# Patient Record
Sex: FEMALE | Race: WHITE | NOT HISPANIC OR LATINO | Employment: PART TIME | URBAN - METROPOLITAN AREA
[De-identification: names, ages, dates, MRNs, and addresses within clinical notes are randomized per-mention and may not be internally consistent; named-entity substitution may affect disease eponyms.]

---

## 2017-06-01 ENCOUNTER — HOSPITAL ENCOUNTER (EMERGENCY)
Facility: HOSPITAL | Age: 52
Discharge: HOME | End: 2017-06-01
Attending: EMERGENCY MEDICINE | Admitting: EMERGENCY MEDICINE
Payer: COMMERCIAL

## 2017-06-01 ENCOUNTER — APPOINTMENT (EMERGENCY)
Dept: RADIOLOGY | Facility: HOSPITAL | Age: 52
End: 2017-06-01
Payer: COMMERCIAL

## 2017-06-01 VITALS
RESPIRATION RATE: 18 BRPM | TEMPERATURE: 98.7 F | HEART RATE: 61 BPM | SYSTOLIC BLOOD PRESSURE: 158 MMHG | DIASTOLIC BLOOD PRESSURE: 77 MMHG | WEIGHT: 250 LBS | OXYGEN SATURATION: 97 %

## 2017-06-01 DIAGNOSIS — M25.551 RIGHT HIP PAIN: Primary | ICD-10-CM

## 2017-06-01 DIAGNOSIS — M79.18 RIGHT BUTTOCK PAIN: ICD-10-CM

## 2017-06-01 PROCEDURE — 73502 X-RAY EXAM HIP UNI 2-3 VIEWS: CPT

## 2017-06-01 PROCEDURE — 99283 EMERGENCY DEPT VISIT LOW MDM: CPT

## 2017-06-01 RX ORDER — MORPHINE SULFATE 30 MG/1
30 TABLET ORAL EVERY 4 HOURS PRN
Qty: 5 TABLET | Refills: 0 | Status: SHIPPED | OUTPATIENT
Start: 2017-06-01 | End: 2018-01-04

## 2018-01-03 ENCOUNTER — HOSPITAL ENCOUNTER (INPATIENT)
Facility: HOSPITAL | Age: 53
LOS: 1 days | Discharge: HOME | DRG: 097 | End: 2018-01-04
Attending: EMERGENCY MEDICINE | Admitting: HOSPITALIST
Payer: COMMERCIAL

## 2018-01-03 ENCOUNTER — APPOINTMENT (EMERGENCY)
Dept: CT IMAGING | Facility: HOSPITAL | Age: 53
DRG: 097 | End: 2018-01-03
Payer: COMMERCIAL

## 2018-01-03 DIAGNOSIS — J36 PERITONSILLAR ABSCESS: Primary | ICD-10-CM

## 2018-01-03 LAB
ALBUMIN SERPL BCP-MCNC: 4 G/DL (ref 3.5–5)
ALP SERPL-CCNC: 134 U/L (ref 46–116)
ALT SERPL W P-5'-P-CCNC: 28 U/L (ref 12–78)
ANION GAP SERPL CALCULATED.3IONS-SCNC: 8 MMOL/L (ref 4–13)
AST SERPL W P-5'-P-CCNC: 14 U/L (ref 5–45)
BASOPHILS # BLD AUTO: 0.01 THOUSANDS/ΜL (ref 0–0.1)
BASOPHILS NFR BLD AUTO: 0 % (ref 0–1)
BILIRUB SERPL-MCNC: 0.3 MG/DL (ref 0.2–1)
BUN SERPL-MCNC: 17 MG/DL (ref 5–25)
CALCIUM SERPL-MCNC: 9.6 MG/DL (ref 8.3–10.1)
CHLORIDE SERPL-SCNC: 103 MMOL/L (ref 100–108)
CO2 SERPL-SCNC: 29 MMOL/L (ref 21–32)
CREAT SERPL-MCNC: 0.87 MG/DL (ref 0.6–1.3)
EOSINOPHIL # BLD AUTO: 0.06 THOUSAND/ΜL (ref 0–0.61)
EOSINOPHIL NFR BLD AUTO: 1 % (ref 0–6)
ERYTHROCYTE [DISTWIDTH] IN BLOOD BY AUTOMATED COUNT: 13.7 % (ref 11.6–15.1)
GFR SERPL CREATININE-BSD FRML MDRD: 76 ML/MIN/1.73SQ M
GLUCOSE SERPL-MCNC: 145 MG/DL (ref 65–140)
HCT VFR BLD AUTO: 40.8 % (ref 34.8–46.1)
HGB BLD-MCNC: 13 G/DL (ref 11.5–15.4)
LYMPHOCYTES # BLD AUTO: 2.79 THOUSANDS/ΜL (ref 0.6–4.47)
LYMPHOCYTES NFR BLD AUTO: 28 % (ref 14–44)
MCH RBC QN AUTO: 27.4 PG (ref 26.8–34.3)
MCHC RBC AUTO-ENTMCNC: 31.9 G/DL (ref 31.4–37.4)
MCV RBC AUTO: 86 FL (ref 82–98)
MONOCYTES # BLD AUTO: 0.72 THOUSAND/ΜL (ref 0.17–1.22)
MONOCYTES NFR BLD AUTO: 7 % (ref 4–12)
NEUTROPHILS # BLD AUTO: 6.3 THOUSANDS/ΜL (ref 1.85–7.62)
NEUTS SEG NFR BLD AUTO: 64 % (ref 43–75)
PLATELET # BLD AUTO: 262 THOUSANDS/UL (ref 149–390)
PMV BLD AUTO: 10.5 FL (ref 8.9–12.7)
POTASSIUM SERPL-SCNC: 3.6 MMOL/L (ref 3.5–5.3)
PROT SERPL-MCNC: 8 G/DL (ref 6.4–8.2)
RBC # BLD AUTO: 4.75 MILLION/UL (ref 3.81–5.12)
S PYO AG THROAT QL: NEGATIVE
SODIUM SERPL-SCNC: 140 MMOL/L (ref 136–145)
WBC # BLD AUTO: 9.88 THOUSAND/UL (ref 4.31–10.16)

## 2018-01-03 PROCEDURE — 87070 CULTURE OTHR SPECIMN AEROBIC: CPT | Performed by: PHYSICIAN ASSISTANT

## 2018-01-03 PROCEDURE — 70491 CT SOFT TISSUE NECK W/DYE: CPT

## 2018-01-03 PROCEDURE — 96361 HYDRATE IV INFUSION ADD-ON: CPT

## 2018-01-03 PROCEDURE — 80053 COMPREHEN METABOLIC PANEL: CPT | Performed by: PHYSICIAN ASSISTANT

## 2018-01-03 PROCEDURE — 96374 THER/PROPH/DIAG INJ IV PUSH: CPT

## 2018-01-03 PROCEDURE — 96375 TX/PRO/DX INJ NEW DRUG ADDON: CPT

## 2018-01-03 PROCEDURE — 87430 STREP A AG IA: CPT | Performed by: PHYSICIAN ASSISTANT

## 2018-01-03 PROCEDURE — 36415 COLL VENOUS BLD VENIPUNCTURE: CPT | Performed by: PHYSICIAN ASSISTANT

## 2018-01-03 PROCEDURE — 81002 URINALYSIS NONAUTO W/O SCOPE: CPT | Performed by: PHYSICIAN ASSISTANT

## 2018-01-03 PROCEDURE — 85025 COMPLETE CBC W/AUTO DIFF WBC: CPT | Performed by: PHYSICIAN ASSISTANT

## 2018-01-03 RX ORDER — DEXAMETHASONE SODIUM PHOSPHATE 10 MG/ML
10 INJECTION, SOLUTION INTRAMUSCULAR; INTRAVENOUS ONCE
Status: COMPLETED | OUTPATIENT
Start: 2018-01-03 | End: 2018-01-03

## 2018-01-03 RX ORDER — MORPHINE SULFATE 4 MG/ML
4 INJECTION, SOLUTION INTRAMUSCULAR; INTRAVENOUS ONCE
Status: COMPLETED | OUTPATIENT
Start: 2018-01-03 | End: 2018-01-03

## 2018-01-03 RX ORDER — CLINDAMYCIN PHOSPHATE 600 MG/50ML
600 INJECTION INTRAVENOUS ONCE
Status: COMPLETED | OUTPATIENT
Start: 2018-01-03 | End: 2018-01-04

## 2018-01-03 RX ADMIN — DEXAMETHASONE SODIUM PHOSPHATE 10 MG: 10 INJECTION, SOLUTION INTRAMUSCULAR; INTRAVENOUS at 22:39

## 2018-01-03 RX ADMIN — SODIUM CHLORIDE 1000 ML: 0.9 INJECTION, SOLUTION INTRAVENOUS at 22:53

## 2018-01-03 RX ADMIN — MORPHINE SULFATE 4 MG: 4 INJECTION INTRAVENOUS at 22:38

## 2018-01-03 NOTE — Clinical Note
Case was discussed with BRIAN and the patient's admission status was agreed to be Admission Status: inpatient status to the service of Dr Abner Plummer

## 2018-01-04 VITALS
OXYGEN SATURATION: 98 % | BODY MASS INDEX: 49.69 KG/M2 | SYSTOLIC BLOOD PRESSURE: 172 MMHG | HEIGHT: 62 IN | RESPIRATION RATE: 18 BRPM | DIASTOLIC BLOOD PRESSURE: 89 MMHG | TEMPERATURE: 98.1 F | HEART RATE: 101 BPM | WEIGHT: 270 LBS

## 2018-01-04 PROBLEM — E11.9 TYPE 2 DIABETES MELLITUS, WITHOUT LONG-TERM CURRENT USE OF INSULIN (HCC): Status: ACTIVE | Noted: 2018-01-04

## 2018-01-04 PROBLEM — J36 PERITONSILLAR ABSCESS: Status: ACTIVE | Noted: 2018-01-04

## 2018-01-04 LAB
ANION GAP SERPL CALCULATED.3IONS-SCNC: 12 MMOL/L (ref 4–13)
BUN SERPL-MCNC: 12 MG/DL (ref 5–25)
CALCIUM SERPL-MCNC: 9.4 MG/DL (ref 8.3–10.1)
CHLORIDE SERPL-SCNC: 102 MMOL/L (ref 100–108)
CLARITY, POC: CLEAR
CO2 SERPL-SCNC: 24 MMOL/L (ref 21–32)
COLOR, POC: YELLOW
CREAT SERPL-MCNC: 0.73 MG/DL (ref 0.6–1.3)
ERYTHROCYTE [DISTWIDTH] IN BLOOD BY AUTOMATED COUNT: 13.6 % (ref 11.6–15.1)
EXT BILIRUBIN, UA: NEGATIVE
EXT BLOOD URINE: NEGATIVE
EXT GLUCOSE, UA: NEGATIVE
EXT KETONES: NEGATIVE
EXT NITRITE, UA: NEGATIVE
EXT PH, UA: 6
EXT PROTEIN, UA: NEGATIVE
EXT SPECIFIC GRAVITY, UA: 1.01
EXT UROBILINOGEN: NEGATIVE
GFR SERPL CREATININE-BSD FRML MDRD: 94 ML/MIN/1.73SQ M
GLUCOSE SERPL-MCNC: 215 MG/DL (ref 65–140)
GLUCOSE SERPL-MCNC: 255 MG/DL (ref 65–140)
GLUCOSE SERPL-MCNC: 257 MG/DL (ref 65–140)
HCT VFR BLD AUTO: 39.2 % (ref 34.8–46.1)
HGB BLD-MCNC: 12.2 G/DL (ref 11.5–15.4)
MCH RBC QN AUTO: 26.7 PG (ref 26.8–34.3)
MCHC RBC AUTO-ENTMCNC: 31.1 G/DL (ref 31.4–37.4)
MCV RBC AUTO: 86 FL (ref 82–98)
PLATELET # BLD AUTO: 252 THOUSANDS/UL (ref 149–390)
PMV BLD AUTO: 10.8 FL (ref 8.9–12.7)
POTASSIUM SERPL-SCNC: 4.4 MMOL/L (ref 3.5–5.3)
RBC # BLD AUTO: 4.57 MILLION/UL (ref 3.81–5.12)
SODIUM SERPL-SCNC: 138 MMOL/L (ref 136–145)
WBC # BLD AUTO: 9.99 THOUSAND/UL (ref 4.31–10.16)
WBC # BLD EST: NEGATIVE 10*3/UL

## 2018-01-04 PROCEDURE — 36415 COLL VENOUS BLD VENIPUNCTURE: CPT | Performed by: PHYSICIAN ASSISTANT

## 2018-01-04 PROCEDURE — 82948 REAGENT STRIP/BLOOD GLUCOSE: CPT

## 2018-01-04 PROCEDURE — 0C9PX0Z DRAINAGE OF TONSILS WITH DRAINAGE DEVICE, EXTERNAL APPROACH: ICD-10-PCS | Performed by: OTOLARYNGOLOGY

## 2018-01-04 PROCEDURE — 85027 COMPLETE CBC AUTOMATED: CPT | Performed by: PHYSICIAN ASSISTANT

## 2018-01-04 PROCEDURE — 80048 BASIC METABOLIC PNL TOTAL CA: CPT | Performed by: PHYSICIAN ASSISTANT

## 2018-01-04 PROCEDURE — 99284 EMERGENCY DEPT VISIT MOD MDM: CPT

## 2018-01-04 RX ORDER — GABAPENTIN 300 MG/1
100 CAPSULE ORAL 3 TIMES DAILY
COMMUNITY

## 2018-01-04 RX ORDER — LOSARTAN POTASSIUM 50 MG/1
50 TABLET ORAL 2 TIMES DAILY
COMMUNITY

## 2018-01-04 RX ORDER — MORPHINE SULFATE 15 MG/1
30 TABLET ORAL EVERY 4 HOURS PRN
Status: DISCONTINUED | OUTPATIENT
Start: 2018-01-04 | End: 2018-01-04 | Stop reason: HOSPADM

## 2018-01-04 RX ORDER — CLINDAMYCIN PHOSPHATE 600 MG/50ML
600 INJECTION INTRAVENOUS EVERY 8 HOURS
Status: DISCONTINUED | OUTPATIENT
Start: 2018-01-04 | End: 2018-01-04 | Stop reason: HOSPADM

## 2018-01-04 RX ORDER — PRAVASTATIN SODIUM 40 MG
40 TABLET ORAL
Status: DISCONTINUED | OUTPATIENT
Start: 2018-01-04 | End: 2018-01-04 | Stop reason: HOSPADM

## 2018-01-04 RX ORDER — SIMVASTATIN 20 MG
20 TABLET ORAL DAILY
COMMUNITY

## 2018-01-04 RX ORDER — LEVOTHYROXINE SODIUM 0.07 MG/1
75 TABLET ORAL DAILY
Status: DISCONTINUED | OUTPATIENT
Start: 2018-01-04 | End: 2018-01-04 | Stop reason: HOSPADM

## 2018-01-04 RX ORDER — LEVOTHYROXINE SODIUM 0.07 MG/1
75 TABLET ORAL DAILY
COMMUNITY

## 2018-01-04 RX ORDER — SODIUM CHLORIDE 9 MG/ML
125 INJECTION, SOLUTION INTRAVENOUS CONTINUOUS
Status: DISCONTINUED | OUTPATIENT
Start: 2018-01-04 | End: 2018-01-04 | Stop reason: HOSPADM

## 2018-01-04 RX ORDER — DEXAMETHASONE SODIUM PHOSPHATE 10 MG/ML
10 INJECTION, SOLUTION INTRAMUSCULAR; INTRAVENOUS EVERY 6 HOURS
Status: DISCONTINUED | OUTPATIENT
Start: 2018-01-04 | End: 2018-01-04 | Stop reason: HOSPADM

## 2018-01-04 RX ORDER — SENNOSIDES 8.6 MG
1 TABLET ORAL DAILY
Status: DISCONTINUED | OUTPATIENT
Start: 2018-01-04 | End: 2018-01-04 | Stop reason: HOSPADM

## 2018-01-04 RX ORDER — ONDANSETRON 2 MG/ML
4 INJECTION INTRAMUSCULAR; INTRAVENOUS EVERY 6 HOURS PRN
Status: DISCONTINUED | OUTPATIENT
Start: 2018-01-04 | End: 2018-01-04 | Stop reason: HOSPADM

## 2018-01-04 RX ORDER — GABAPENTIN 100 MG/1
100 CAPSULE ORAL 3 TIMES DAILY
Status: DISCONTINUED | OUTPATIENT
Start: 2018-01-04 | End: 2018-01-04 | Stop reason: HOSPADM

## 2018-01-04 RX ORDER — CALCIUM CARBONATE 200(500)MG
1000 TABLET,CHEWABLE ORAL DAILY PRN
Status: DISCONTINUED | OUTPATIENT
Start: 2018-01-04 | End: 2018-01-04 | Stop reason: HOSPADM

## 2018-01-04 RX ORDER — METHYLPREDNISOLONE 4 MG/1
TABLET ORAL
Qty: 21 TABLET | Refills: 0 | Status: SHIPPED | OUTPATIENT
Start: 2018-01-04

## 2018-01-04 RX ORDER — LOSARTAN POTASSIUM 50 MG/1
50 TABLET ORAL 2 TIMES DAILY
Status: DISCONTINUED | OUTPATIENT
Start: 2018-01-04 | End: 2018-01-04 | Stop reason: HOSPADM

## 2018-01-04 RX ORDER — CLINDAMYCIN HYDROCHLORIDE 300 MG/1
300 CAPSULE ORAL 4 TIMES DAILY
Qty: 28 CAPSULE | Refills: 0 | Status: SHIPPED | OUTPATIENT
Start: 2018-01-04 | End: 2018-01-11

## 2018-01-04 RX ADMIN — MORPHINE SULFATE 30 MG: 15 TABLET ORAL at 08:46

## 2018-01-04 RX ADMIN — INSULIN LISPRO 4 UNITS: 100 INJECTION, SOLUTION INTRAVENOUS; SUBCUTANEOUS at 07:07

## 2018-01-04 RX ADMIN — ENOXAPARIN SODIUM 40 MG: 40 INJECTION SUBCUTANEOUS at 08:41

## 2018-01-04 RX ADMIN — LOSARTAN POTASSIUM 50 MG: 50 TABLET, FILM COATED ORAL at 08:48

## 2018-01-04 RX ADMIN — IOHEXOL 85 ML: 350 INJECTION, SOLUTION INTRAVENOUS at 00:16

## 2018-01-04 RX ADMIN — LEVOTHYROXINE SODIUM 75 MCG: 75 TABLET ORAL at 08:20

## 2018-01-04 RX ADMIN — SODIUM CHLORIDE 125 ML/HR: 0.9 INJECTION, SOLUTION INTRAVENOUS at 04:18

## 2018-01-04 RX ADMIN — CLINDAMYCIN PHOSPHATE 600 MG: 12 INJECTION, SOLUTION INTRAMUSCULAR; INTRAVENOUS at 01:17

## 2018-01-04 RX ADMIN — DEXAMETHASONE SODIUM PHOSPHATE 10 MG: 10 INJECTION, SOLUTION INTRAMUSCULAR; INTRAVENOUS at 05:42

## 2018-01-04 RX ADMIN — CLINDAMYCIN PHOSPHATE 600 MG: 12 INJECTION, SOLUTION INTRAMUSCULAR; INTRAVENOUS at 08:50

## 2018-01-04 RX ADMIN — DEXAMETHASONE SODIUM PHOSPHATE 10 MG: 10 INJECTION, SOLUTION INTRAMUSCULAR; INTRAVENOUS at 10:35

## 2018-01-04 NOTE — DISCHARGE SUMMARY
Discharge- Lisbeth Saenz 1965, 45 y o  female MRN: [id number]    Unit/Bed#: ED 15 Encounter: [id number]    Primary Care Provider: Adia Mercado DO   Date and time admitted to hospital: 1/3/2018  9:53 PM        * Peritonsillar abscess   Assessment & Plan    · Status post incision and drainage  · DC home p o  clindamycin and steroids         Type 2 diabetes mellitus, without long-term current use of insulin ([address] )   Assessment & Plan    · Discussed with patient to monitor sugars closely while on steroid taper  · Follow up PCP this week              Consultations During Hospital Stay:  · ENT    Procedures Performed:     · Bedside incision and drainage of peritonsillar abscess  · CT of the neck Acute left tonsillitis with tonsillar and peritonsillar abscess measuring approximately 3 5 x 3 3 x 2 4 cm as described above  Incidental thyroid nodule(s) for which nonemergent thyroid ultrasound is recommended  Significant Findings / Test Results:     · As above    Incidental Findings:   · Thyroid nodule     Test Results Pending at Discharge (will require follow up): · None     Outpatient Tests Requested:  · None    Complications:  None    Reason for Admission:  Pain and sore throat    Hospital Course:     Lisbeth Saenz is a 45 y o  female patient who originally presented to the hospital on 1/3/2018 due to pain and sore throat  She was on amoxicillin for 1 week for upper surge tact infection without significant improvement then diagnosed with neck strain but continued having increasing pain with swallowing kidney your evaluation noted to have a per daughter abscess started on antibiotics and greatly    Please note the patient was felt to require inpatient admission due to degree of pain need for intravenous antibiotics and IV pain medications    She may dramatic and inspect improvement after ENT intervention is now stable for discharge to home    Please see above list of diagnoses and related plan for additional information  Condition at Discharge: good     Discharge Day Visit / Exam:     Subjective:  I feel much better  Vitals: Blood Pressure: (!) 175/79 (01/04/18 0725)  Pulse: 71 (01/04/18 0725)  Temperature: 98 1 °F (36 7 °C) (01/04/18 0725)  Temp Source: Oral (01/04/18 0725)  Respirations: 16 (01/04/18 0725)  Height: 5' 2" (157 5 cm) (01/04/18 0324)  Weight - Scale: 122 kg (270 lb) (01/03/18 2117)  SpO2: 96 % (01/04/18 0725)  Exam:   Physical Exam   Constitutional: She is oriented to person, place, and time  No distress  HENT:   Head: Normocephalic and atraumatic  Mouth/Throat: Oropharynx is clear and moist  No oropharyngeal exudate  No neck masses subtle nontender   Eyes: EOM are normal  Pupils are equal, round, and reactive to light  No scleral icterus  Neck: Neck supple  No JVD present  No tracheal deviation present  No thyromegaly present  Cardiovascular: Normal rate  Exam reveals no gallop and no friction rub  No murmur heard  Pulmonary/Chest: Effort normal  No respiratory distress  She has no wheezes  She has no rales  Abdominal: Soft  She exhibits no distension  There is no tenderness  There is no rebound and no guarding  Musculoskeletal: She exhibits no edema or tenderness  Lymphadenopathy:     She has no cervical adenopathy  Neurological: She is alert and oriented to person, place, and time  Skin: She is not diaphoretic  Discharge instructions/Information to patient and family:   See after visit summary for information provided to patient and family  Provisions for Follow-Up Care:  See after visit summary for information related to follow-up care and any pertinent home health orders  Disposition:     Home    For Discharges to [facility] SNF:   · Not Applicable to this Patient - Not Applicable to this Patient    Planned Readmission:  Now     Discharge Statement:  I spent 30 minutes discharging the patient  This time was spent on the day of discharge   I had direct contact with the patient on the day of discharge  Greater than 50% of the total time was spent examining patient, answering all patient questions, arranging and discussing plan of care with patient as well as directly providing post-discharge instructions  Additional time then spent on discharge activities  Discharge Medications:  See after visit summary for reconciled discharge medications provided to patient and family        ** Please Note: This note has been constructed using a voice recognition system **

## 2018-01-04 NOTE — CONSULTS
Left PTA     Drained 5 cc of pus with aspiration    Plan ; ok to d/c on po clindamycin and medrol dose armando     F/u in my office to assure complete resolution and plan for T/A

## 2018-01-04 NOTE — ASSESSMENT & PLAN NOTE
· CT neck soft tissue:  Tonsillar enlargement bilaterally with a low-density identified in the left tonsillar region which measures 3 1x1 9 cm and extends laterally to the level of the tip of the stylohyoid ligament  · Pain control  · IV clinda  · IV dexamethasone  · ENT consulted, appreciate input

## 2018-01-04 NOTE — H&P
H&P- Kristin Capps 1965, 45 y o  female MRN: [id number]    Unit/Bed#: ED 15 Encounter: [id number]    Primary Care Provider: Swetha Watts DO   Date and time admitted to hospital: 1/3/2018  9:53 PM    * Peritonsillar abscess   Assessment & Plan    · CT neck soft tissue: Tonsillar enlargement bilaterally with a low-density identified in the left tonsillar region which measures 3 1x1 9 cm and extends laterally to the level of the tip of the stylohyoid ligament  · Pain control  · IV clinda  · IV dexamethasone  · ENT consulted, appreciate input        Type 2 diabetes mellitus, without long-term current use of insulin (HCC)   Assessment & Plan    · Hold PO while inpatient, SSI for correction        Hypertension   Assessment & Plan    · Continue home medications          Acquired hypothyroidism   Assessment & Plan    · Continue synthroid            VTE Prophylaxis: Enoxaparin (Lovenox)  / sequential compression device   Code Status: Level 1-- Full Code  POLST: POLST form is not discussed and not completed at this time  Discussion with family: no family available     Anticipated Length of Stay:  Patient will be admitted on an Inpatient basis with an anticipated length of stay of  > 2 midnights  Justification for Hospital Stay: peritonsillar abscess tx by ENT    Total Time for Visit, including Counseling / Coordination of Care: 30 minutes  Greater than 50% of this total time spent on direct patient counseling and coordination of care  Chief Complaint:   Sore throat    History of Present Illness:    Kristin Capps is a 45 y o  female with past medical history significant for hypertension, hyperlipidemia, hypothyroidism since the ED for evaluation of sore throat x1 week  Patient reports she noted symptoms started approximately 1 year ago when she slipped and fell upper steps on ice and heard a crack on the left side of her neck  Since then, patient reports increasing pain and swelling   Initially, patient went to an urgent care and was diagnosed with a sinus infection  Patient was placed on antibiotics and had no improvement of symptoms  She reports being on amoxicillin for approximately 1 week  Patient went to her primary care doctor and she was diagnosed with the neck strain  Patient reports over the last 24 hours she has had increased difficulty talking and swallowing secondary to pain  She denies any fevers or chills  Denies any shortness of breath or respiratory distress  Patient reports her 13year-old child at home was sick with similar symptoms  Review of Systems:    Review of Systems   Constitutional: Negative  HENT: Positive for sore throat, trouble swallowing and voice change  Eyes: Negative  Cardiovascular: Negative  Gastrointestinal: Negative  Genitourinary: Negative  Musculoskeletal: Positive for neck pain  Skin: Negative  Neurological: Negative  Hematological: Negative  Psychiatric/Behavioral: Negative  Past Medical and Surgical History:     Past Medical History:   Diagnosis Date    Diabetes mellitus [address] [address]     Disease of thyroid gland     Hypertension        Past Surgical History:   Procedure Laterality Date    BUNIONECTOMY Left     SINUS SURGERY         Meds/Allergies:    Prior to Admission medications    Medication Sig Start Date End Date Taking? Authorizing Provider   gabapentin (NEURONTIN) 300 mg capsule Take 100 mg by mouth 3 (three) times a day   Yes Historical Provider, MD   levothyroxine 75 mcg tablet Take 75 mcg by mouth daily   Yes Historical Provider, MD   losartan (COZAAR) 50 mg tablet Take 50 mg by mouth 2 (two) times a day 50 mg in the morning and 25 mg at night     Yes Historical Provider, MD   metFORMIN (GLUCOPHAGE) 500 mg tablet Take 500 mg by mouth 2 (two) times a day with meals   Yes Historical Provider, MD   simvastatin (ZOCOR) 20 mg tablet Take 20 mg by mouth daily   Yes Historical Provider, MD   sitaGLIPtin (JANUVIA) 100 mg tablet Take 100 mg by mouth daily   Yes Historical Provider, MD   morphine (MSIR) 30 MG tablet Take 1 tablet by mouth every 4 (four) hours as needed for moderate pain or severe pain for up to 5 doses Max Daily Amount: 180 mg 6/1/17 1/4/18  Annabella Estevez MD     I have reviewed home medications with patient personally  Allergies: Allergies   Allergen Reactions    Sulfa Antibiotics Swelling       Social History:     Marital Status: Single   Occupation: employee of [employer]  Patient Pre-hospital Living Situation: home with family  Patient Pre-hospital Level of Mobility: independent  Patient Pre-hospital Diet Restrictions: diabetic prudent  Substance Use History:   History   Alcohol Use    Yes     Comment: socially     History   Smoking Status    Former Smoker   Smokeless Tobacco    Never Used     History   Drug Use No       Family History:    non-contributory    Physical Exam:     Vitals:   Blood Pressure: 152/71 (01/04/18 0549)  Pulse: (!) 53 (01/04/18 0549)  Temperature: 97 7 °F (36 5 °C) (01/03/18 2117)  Temp Source: Oral (01/03/18 2117)  Respirations: 18 (01/04/18 0549)  Height: 5' 2" (157 5 cm) (01/04/18 0324)  Weight - Scale: 122 kg (270 lb) (01/03/18 2117)  SpO2: 96 % (01/04/18 0549)    Physical Exam   Constitutional: She is oriented to person, place, and time  She appears well-developed and well-nourished  No distress  HENT:   Head: Normocephalic and atraumatic  Mouth/Throat: No oropharyngeal exudate  No exudates  Swelling surrounding the L tonsil with erythema  Eyes: Conjunctivae and EOM are normal  Pupils are equal, round, and reactive to light  Neck: No JVD present  L submandibular region swollen   Cardiovascular: Normal rate and regular rhythm  Exam reveals no gallop and no friction rub  No murmur heard  Pulmonary/Chest: Effort normal and breath sounds normal  No respiratory distress  She has no wheezes  She has no rales  Abdominal: Soft   Bowel sounds are normal  She exhibits no distension  There is no tenderness  There is no rebound  Musculoskeletal: She exhibits no edema or tenderness  Neurological: She is alert and oriented to person, place, and time  She displays normal reflexes  No cranial nerve deficit  She exhibits normal muscle tone  Skin: Skin is warm and dry  No rash noted  She is not diaphoretic  No erythema  Psychiatric: She has a normal mood and affect  Her behavior is normal            Additional Data:     Lab Results: I have personally reviewed pertinent reports  Results from last 7 days  Lab Units 01/03/18  2231   WBC Thousand/uL 9 88   HEMOGLOBIN g/dL 13 0   HEMATOCRIT % 40 8   PLATELETS Thousands/uL 262   NEUTROS PCT % 64   LYMPHS PCT % 28   MONOS PCT % 7   EOS PCT % 1       Results from last 7 days  Lab Units 01/03/18  2231   SODIUM mmol/L 140   POTASSIUM mmol/L 3 6   CHLORIDE mmol/L 103   CO2 mmol/L 29   BUN mg/dL 17   CREATININE mg/dL 0 87   CALCIUM mg/dL 9 6   TOTAL PROTEIN g/dL 8 0   BILIRUBIN TOTAL mg/dL 0 30   ALK PHOS U/L 134*   ALT U/L 28   AST U/L 14   GLUCOSE RANDOM mg/dL 145*           Imaging: I have personally reviewed pertinent reports  CT soft tissue neck with contrast   ED Interpretation by Rigo Higgins PA-C (01/04 0050)   IMPRESSION:   There is tonsillar enlargement bilaterally with a low density identified in the left tonsillar region which   measures 3 1 x 1 9 cm on image 44 and extends laterally to the level of the tip of the stylohyoid   ligament, consistent with a peritonsillar abscess  Final Result by Celine Washington MD (00/74 [id number])      Acute left tonsillitis with tonsillar and peritonsillar abscess measuring approximately 3 5 x 3 3 x 2 4 cm as described above  Incidental thyroid nodule(s) for which nonemergent thyroid ultrasound is recommended        Findings are consistent with the preliminary report from [facility] which was provided shortly after completion of the exam  Workstation performed: [id number]             EKG, Pathology, and Other Studies Reviewed on Admission:   · EKG: unavailable    Allscripts / Epic Records Reviewed: No     ** Please Note: This note has been constructed using a voice recognition system   **

## 2018-01-04 NOTE — ASSESSMENT & PLAN NOTE
· Discussed with patient to monitor sugars closely while on steroid taper    · Follow up PCP this week

## 2018-01-04 NOTE — ED PROVIDER NOTES
History  Chief Complaint   Patient presents with    Sore Throat     pt c/o fall x 1 week prior, "heard a snap in her neck and ever since c/o sore thoat with high blood pressure"     49-year-old female presents to the emergency department with complaints of a sore throat  States that she has had her symptoms over the past week  Notes that prior to symptoms starting she had fallen and felt a crack in the left side of her neck  States that she has had increasing pain since that time  Denies any changes in her vision or hearing  States that she has not had any fevers at home  Notes that her 13year-old child was sick with similar symptoms of a sore throat with seems to gotten better  Patient states that she has been on amoxicillin over the past week without improvement  Notes that over the past 24 hours she has had difficulty talking and swallowing  Also with increased pain when trying to open her mouth  States that the left side of her neck feels swollen with radiation of pain into the ear  She does not smoke  History provided by:  Patient   used: No    Sore Throat   Location:  Left  Quality:  Aching and sore  Onset quality:  Gradual  Duration:  1 week  Timing:  Constant  Progression:  Worsening  Chronicity:  New  Relieved by:  Nothing  Ineffective treatments:  OTC medications (Amoxicillin)  Associated symptoms: adenopathy, trouble swallowing and voice change    Associated symptoms: no abdominal pain, no chest pain, no chills, no cough, no drooling, no ear discharge, no ear pain, no epistaxis, no eye discharge, no fever, no headaches, no neck stiffness, no night sweats, no plugged ear sensation, no postnasal drip, no rash, no rhinorrhea, no shortness of breath, no sinus congestion and no stridor        Prior to Admission Medications   Prescriptions Last Dose Informant Patient Reported?  Taking?   metFORMIN (GLUCOPHAGE) 500 mg tablet   Yes No   Sig: Take 500 mg by mouth 2 (two) times a day with meals   morphine (MSIR) 30 MG tablet   No No   Sig: Take 1 tablet by mouth every 4 (four) hours as needed for moderate pain or severe pain for up to 5 doses Max Daily Amount: 180 mg      Facility-Administered Medications: None       Past Medical History:   Diagnosis Date    Diabetes mellitus ([facility] [address] )     Disease of thyroid gland     Hypertension        Past Surgical History:   Procedure Laterality Date    BUNIONECTOMY Left     SINUS SURGERY         History reviewed  No pertinent family history  I have reviewed and agree with the history as documented  Social History   Substance Use Topics    Smoking status: Former Smoker    Smokeless tobacco: Never Used    Alcohol use Yes      Comment: socially        Review of Systems   Constitutional: Negative for activity change, chills, fever and night sweats  HENT: Positive for sore throat, trouble swallowing and voice change  Negative for dental problem, drooling, ear discharge, ear pain, mouth sores, nosebleeds, postnasal drip, rhinorrhea, sinus pressure, sneezing and tinnitus  Eyes: Negative for pain, discharge and itching  Respiratory: Negative for cough, chest tightness, shortness of breath, wheezing and stridor  Cardiovascular: Negative for chest pain  Gastrointestinal: Negative for abdominal pain  Musculoskeletal: Negative for neck stiffness  Skin: Negative for rash and wound  Neurological: Negative for dizziness, light-headedness and headaches  Hematological: Positive for adenopathy  All other systems reviewed and are negative        Physical Exam  ED Triage Vitals [01/03/18 2117]   Temperature Pulse Respirations Blood Pressure SpO2   97 7 °F (36 5 °C) 68 18 (!) 208/102 100 %      Temp Source Heart Rate Source Patient Position - Orthostatic VS BP Location FiO2 (%)   Oral Monitor Sitting Left arm --      Pain Score       Worst Possible Pain           Orthostatic Vital Signs  Vitals:    01/03/18 2117   BP: (!) 208/102 Pulse: 68   Patient Position - Orthostatic VS: Sitting       Physical Exam   Constitutional: She is oriented to person, place, and time  Vital signs are normal  She appears well-developed and well-nourished  No distress  HENT:   Head: Normocephalic and atraumatic  Right Ear: Hearing, tympanic membrane, external ear and ear canal normal    Left Ear: Hearing, tympanic membrane, external ear and ear canal normal    Nose: Nose normal    Mouth/Throat: Uvula is midline  There is trismus in the jaw  Posterior oropharyngeal erythema present  No oropharyngeal exudate  Right-sided uvular deviation with swelling in the posterior left-sided oral pharynx and tonsillar area  Eyes: Conjunctivae, EOM and lids are normal    Cardiovascular: Normal rate and regular rhythm  Exam reveals no gallop and no friction rub  No murmur heard  Pulmonary/Chest: Effort normal and breath sounds normal  No respiratory distress  She has no wheezes  She has no rhonchi  She has no rales  She exhibits no tenderness  Musculoskeletal: Normal range of motion  Neurological: She is alert and oriented to person, place, and time  Skin: Skin is warm and dry  She is not diaphoretic  Psychiatric: She has a normal mood and affect  Her behavior is normal    Vitals reviewed        ED Medications  Medications   clindamycin (CLEOCIN) IVPB (premix) 600 mg (not administered)   sodium chloride 0 9 % bolus 1,000 mL (1,000 mL Intravenous New Bag 1/3/18 2253)   dexamethasone (PF) (DECADRON) injection 10 mg (10 mg Intravenous Given 1/3/18 2239)   morphine (PF) 4 mg/mL injection 4 mg (4 mg Intravenous Given 1/3/18 2238)   iohexol (OMNIPAQUE) 350 MG/ML injection (MULTI-DOSE) 85 mL (85 mL Intravenous Given 1/4/18 0016)       Diagnostic Studies  Results Reviewed     Procedure Component Value Units Date/Time    Comprehensive metabolic panel [[id number]]  (Abnormal) Collected:  01/03/18 2231    Lab Status:  Final result Specimen:  Blood from Arm, Right Updated: 01/03/18 2307     Sodium 140 mmol/L      Potassium 3 6 mmol/L      Chloride 103 mmol/L      CO2 29 mmol/L      Anion Gap 8 mmol/L      BUN 17 mg/dL      Creatinine 0 87 mg/dL      Glucose 145 (H) mg/dL      Calcium 9 6 mg/dL      AST 14 U/L      ALT 28 U/L      Alkaline Phosphatase 134 (H) U/L      Total Protein 8 0 g/dL      Albumin 4 0 g/dL      Total Bilirubin 0 30 mg/dL      eGFR 76 ml/min/1 73sq m     Narrative:         National Kidney Disease Education Program recommendations are as follows:  GFR calculation is accurate only with a steady state creatinine  Chronic Kidney disease less than 60 ml/min/1 73 sq  meters  Kidney failure less than 15 ml/min/1 73 sq  meters  Rapid Beta strep screen [[id number]]  (Normal) Collected:  01/03/18 2231    Lab Status:  Final result Specimen:  Throat from Throat Updated:  01/03/18 2248     Rapid Strep A Screen Negative    Throat culture [[id number]] Collected:  01/03/18 2231    Lab Status:   In process Specimen:  Throat from Throat Updated:  01/03/18 2248    CBC and differential [[id number]]  (Normal) Collected:  01/03/18 2231    Lab Status:  Final result Specimen:  Blood from Arm, Right Updated:  01/03/18 2238     WBC 9 88 Thousand/uL      RBC 4 75 Million/uL      Hemoglobin 13 0 g/dL      Hematocrit 40 8 %      MCV 86 fL      MCH 27 4 pg      MCHC 31 9 g/dL      RDW 13 7 %      MPV 10 5 fL      Platelets 468 Thousands/uL      Neutrophils Relative 64 %      Lymphocytes Relative 28 %      Monocytes Relative 7 %      Eosinophils Relative 1 %      Basophils Relative 0 %      Neutrophils Absolute 6 30 Thousands/µL      Lymphocytes Absolute 2 79 Thousands/µL      Monocytes Absolute 0 72 Thousand/µL      Eosinophils Absolute 0 06 Thousand/µL      Basophils Absolute 0 01 Thousands/µL     POCT urinalysis dipstick [[id number]]     Lab Status:  No result Specimen:  Urine                  CT soft tissue neck with contrast   ED Interpretation by Kevin Ayala PA-C (01/04 0050) IMPRESSION:   There is tonsillar enlargement bilaterally with a low density identified in the left tonsillar region which   measures 3 1 x 1 9 cm on image 44 and extends laterally to the level of the tip of the stylohyoid   ligament, consistent with a peritonsillar abscess  Procedures  Procedures       Phone Contacts  ED Phone Contact    ED Course  ED Course as of Jan 04 0102   u Jan 04, 2018   [id number] Call placed to ENT on call  Annette 83Maryuri with Dr Whit Gordon regarding results  [facility] and he will see in the morning  MDM  Number of Diagnoses or Management Options  Diagnosis management comments: Differential diagnosis includes but not limited to:  Peritonsillar abscess, pharyngeal abscess, phlegmon, strep pharyngitis  Amount and/or Complexity of Data Reviewed  Clinical lab tests: ordered and reviewed  Tests in the radiology section of CPT®: ordered      CritCare Time    Disposition  Final diagnoses:   Peritonsillar abscess     Time reflects when diagnosis was documented in both MDM as applicable and the Disposition within this note     Time User Action Codes Description Comment    1/4/2018  1:00 AM Jose Maurer Add [J36] Peritonsillar abscess       ED Disposition     ED Disposition Condition Comment    Admit  Case was discussed with BRIAN and the patient's admission status was agreed to be Admission Status: inpatient status to the service of Dr Do Gains   Follow-up Information    None       Patient's Medications   Discharge Prescriptions    No medications on file     No discharge procedures on file      ED Provider  Electronically Signed by           Kevin Ayala PA-C  01/04/18 0258

## 2018-01-04 NOTE — ED NOTES
Pt  Discharged, awake and alert, no distress  Pt  With no questions at time of dispo  Pt  Ambulated out of dept   With      Kelli Mendieta RN  01/04/18 1443

## 2018-01-04 NOTE — CASE MANAGEMENT
Initial Clinical Review    Admission: Date/Time/Statement: 1/4/18 @ 0102     Orders Placed This Encounter   Procedures    Inpatient Admission (expected length of stay for this patient is greater than two midnights)     Standing Status:   Standing     Number of Occurrences:   1     Order Specific Question:   Admitting Physician     Answer:   Jocelyn Morrison [[id number]]     Order Specific Question:   Level of Care     Answer:   Med Surg [16]     Order Specific Question:   Estimated length of stay     Answer:   More than 2 Midnights     Order Specific Question:   Certification     Answer:   I certify that inpatient services are medically necessary for this patient for a duration of greater than two midnights  See H&P and MD Progress Notes for additional information about the patient's course of treatment  ED: Date/Time/Mode of Arrival:   ED Arrival Information     Expected Arrival Acuity Means of Arrival Escorted By Service Admission Type    - 1/3/2018 21:02 Less Urgent Walk-In Self General Medicine Urgent    Arrival Complaint    SORE THROAT          Chief Complaint:   Chief Complaint   Patient presents with    Sore Throat     pt c/o fall x 1 week prior, "heard a snap in her neck and ever since c/o sore thoat with high blood pressure"       History of Illness: 45 y o  female with past medical history significant for hypertension, hyperlipidemia, hypothyroidism since the ED for evaluation of sore throat x1 week  Patient reports she noted symptoms started approximately 1 year ago when she slipped and fell upper steps on ice and heard a crack on the left side of her neck  Since then, patient reports increasing pain and swelling  Initially, patient went to an urgent care and was diagnosed with a sinus infection  Patient was placed on antibiotics and had no improvement of symptoms  She reports being on amoxicillin for approximately 1 week    Patient went to her primary care doctor and she was diagnosed with the neck strain  Patient reports over the last 24 hours she has had increased difficulty talking and swallowing secondary to pain  She denies any fevers or chills  Denies any shortness of breath or respiratory distress  Patient reports her 13year-old child at home was sick with similar symptoms  ED Vital Signs:   ED Triage Vitals [01/03/18 2117]   Temperature Pulse Respirations Blood Pressure SpO2   97 7 °F (36 5 °C) 68 18 (!) 208/102 100 %      Temp Source Heart Rate Source Patient Position - Orthostatic VS BP Location FiO2 (%)   Oral Monitor Sitting Left arm --      Pain Score       Worst Possible Pain        Wt Readings from Last 1 Encounters:   01/03/18 122 kg (270 lb)       Vital Signs (abnormal):  /102 - 175/79    Abnormal Labs/Diagnostic Test Results:   Glucose 145  Alkaline phosphatase 134  Ct soft tissue neck - Acute left tonsillitis with tonsillar and peritonsillar abscess measuring approximately 3 5 x 3 3 x 2 4 cm as described above  Incidental thyroid nodule(s) for which nonemergent thyroid ultrasound is recommended  1/4 0631- glucose 257        ED Treatment:   Medication Administration from 01/03/2018 2102 to 01/04/2018 7222       Date/Time Order Dose Route Action Action by Comments     01/04/2018 0119 sodium chloride 0 9 % bolus 1,000 mL 0 mL Intravenous Stopped Gael Polo RN      01/03/2018 2253 sodium chloride 0 9 % bolus 1,000 mL 1,000 mL Intravenous [address] Hugo Hsu [facility]      01/03/2018 2239 dexamethasone (PF) (DECADRON) injection 10 mg 10 mg Intravenous Given Hugo Hsu RN      01/03/2018 2238 morphine (PF) 4 mg/mL injection 4 mg 4 mg Intravenous Given Hugo Hsu RN      01/04/2018 0226 clindamycin (CLEOCIN) IVPB (premix) 600 mg 0 mg Intravenous Stopped Suellen Leventhal, RN      01/04/2018 0117 clindamycin (CLEOCIN) IVPB (premix) 600 mg 600 mg Intravenous [address] Gael Polo RN      01/04/2018 0016 iohexol (OMNIPAQUE) 350 MG/ML injection (MULTI-DOSE) 85 mL 85 mL Intravenous Given Judy Hendrix [place]-[facility]son      01/04/2018 0846 morphine (MSIR) IR tablet 30 mg 30 mg Oral Given Mohit Corral RN      01/04/2018 0418 sodium chloride 0 9 % infusion 125 mL/hr Intravenous New Bag Ronald Ledbetter RN      01/04/2018 0840 senna (SENOKOT) tablet 8 6 mg 8 6 mg Oral Not Given Mohit Corral RN      01/04/2018 0841 enoxaparin (LOVENOX) subcutaneous injection 40 mg 40 mg Subcutaneous Given Mohit Corral RN      01/04/2018 9814 insulin lispro (HumaLOG) 100 units/mL subcutaneous injection 2-12 Units 4 Units Subcutaneous Given Ronald Ledbetter RN      01/04/2018 0542 dexamethasone (PF) (DECADRON) injection 10 mg 10 mg Intravenous Given Ronald Ledbetter RN      01/04/2018 0850 clindamycin (CLEOCIN) IVPB (premix) 600 mg 600 mg Intravenous [address] Mohit Corral RN      01/04/2018 0326 clindamycin (CLEOCIN) IVPB (premix) 600 mg 600 mg Intravenous Not Given Ronald Ledbetter RN Pt given medication at 9914  Provider notified  01/04/2018 0840 gabapentin (NEURONTIN) capsule 100 mg 100 mg Oral Not Given Mohit Corral RN      01/04/2018 0820 levothyroxine tablet 75 mcg 75 mcg Oral Given Mohit Corral RN      01/04/2018 0848 losartan (COZAAR) tablet 50 mg 50 mg Oral Given Mohit Corral RN           Past Medical/Surgical History: Active Ambulatory Problems     Diagnosis Date Noted    No Active Ambulatory Problems     Resolved Ambulatory Problems     Diagnosis Date Noted    No Resolved Ambulatory Problems     Past Medical History:   Diagnosis Date    Diabetes mellitus ([facility] [address] )     Disease of thyroid gland     Hypertension        Admitting Diagnosis: Sore throat [J02 9]    Age/Sex: 45 y o  female    Assessment/Plan:   Peritonsillar abscess   Assessment & Plan     · CT neck soft tissue:  Tonsillar enlargement bilaterally with a low-density identified in the left tonsillar region which measures 3 1x1 9 cm and extends laterally to the level of the tip of the stylohyoid ligament  · Pain control  · IV clinda  · IV dexamethasone  · ENT consulted, appreciate input       Type 2 diabetes mellitus, without long-term current use of insulin (HCC)   Assessment & Plan     · Hold PO while inpatient, SSI for correction       Hypertension   Assessment & Plan     · Continue home medications          Acquired hypothyroidism   Assessment & Plan     · Continue synthroid         Admission Orders:  1/4/2018  0102 INPATIENT   Scheduled Meds:   clindamycin 600 mg Intravenous Q8H   dexamethasone 10 mg Intravenous Q6H   enoxaparin 40 mg Subcutaneous Daily   gabapentin 100 mg Oral TID   insulin lispro 2-12 Units Subcutaneous Q6H [address]   levothyroxine 75 mcg Oral Daily   losartan 50 mg Oral BID   pravastatin 40 mg Oral Daily With Dinner   senna 1 tablet Oral Daily     Continuous Infusions:   sodium chloride 125 mL/hr Last Rate: 125 mL/hr (01/04/18 0418)     PRN Meds:   calcium carbonate    Morphine - used x 1      Ondansetron    OTHER ORDERS: scds  Fingerstick glucose q 6h  Throat culture  NPO  Consult ENT      Thank you,  [address] [place] [address] in the [facility] by [address] for 2017  Network Utilization Review Department  Phone: [phone]; Fax [phone]  ATTENTION: The Network Utilization Review Department is now centralized for our 7 Facilities  Make a note that we have a new phone and fax numbers for our Department  Please call with any questions or concerns to [phone] and carefully follow the prompts so that you are directed to the right person  All voicemails are confidential  Fax any determinations, approvals, denials, and requests for initial or continue stay review clinical to [phone]  Due to HIGH CALL volume, it would be easier if you could please send faxed requests to expedite your requests and in part, help us provide discharge notifications faster

## 2018-01-04 NOTE — INCIDENTAL FINDINGS
The following findings require follow up:  Radiographic finding   Finding:  Thyroid nodule   Follow up required:  Thyroid ultrasound   Follow up should be done within 1 month(s)    Please notify the following clinician to assist with the follow up:   Dr Mack Valencia

## 2018-01-06 LAB — BACTERIA THROAT CULT: NORMAL

## 2018-01-09 ENCOUNTER — TRANSCRIBE ORDERS (OUTPATIENT)
Dept: ADMINISTRATIVE | Facility: HOSPITAL | Age: 53
End: 2018-01-09

## 2018-01-09 DIAGNOSIS — E04.1 NONTOXIC UNINODULAR GOITER: Primary | ICD-10-CM

## 2018-01-11 ENCOUNTER — GENERIC CONVERSION - ENCOUNTER (OUTPATIENT)
Dept: OTHER | Facility: OTHER | Age: 53
End: 2018-01-11

## 2018-01-11 ENCOUNTER — HOSPITAL ENCOUNTER (OUTPATIENT)
Dept: ULTRASOUND IMAGING | Facility: HOSPITAL | Age: 53
Discharge: HOME | End: 2018-01-11
Attending: OTOLARYNGOLOGY
Payer: COMMERCIAL

## 2018-01-11 DIAGNOSIS — E04.1 NONTOXIC UNINODULAR GOITER: ICD-10-CM

## 2018-01-11 PROCEDURE — 76536 US EXAM OF HEAD AND NECK: CPT

## 2018-01-16 ENCOUNTER — TRANSCRIBE ORDERS (OUTPATIENT)
Dept: ADMINISTRATIVE | Facility: HOSPITAL | Age: 53
End: 2018-01-16

## 2018-01-16 DIAGNOSIS — E04.1 NONTOXIC UNINODULAR GOITER: Primary | ICD-10-CM

## 2018-01-29 ENCOUNTER — HOSPITAL ENCOUNTER (OUTPATIENT)
Dept: RADIOLOGY | Facility: HOSPITAL | Age: 53
Discharge: HOME | End: 2018-01-29
Attending: OTOLARYNGOLOGY

## 2018-02-02 ENCOUNTER — HOSPITAL ENCOUNTER (OUTPATIENT)
Dept: RADIOLOGY | Facility: HOSPITAL | Age: 53
Discharge: HOME | End: 2018-02-02
Attending: OTOLARYNGOLOGY

## 2018-02-05 ENCOUNTER — HOSPITAL ENCOUNTER (OUTPATIENT)
Dept: RADIOLOGY | Facility: HOSPITAL | Age: 53
Discharge: HOME | End: 2018-02-05
Attending: OTOLARYNGOLOGY | Admitting: RADIOLOGY
Payer: COMMERCIAL

## 2018-02-05 DIAGNOSIS — E04.1 NONTOXIC UNINODULAR GOITER: ICD-10-CM

## 2018-02-05 PROCEDURE — 88173 CYTOPATH EVAL FNA REPORT: CPT | Performed by: OTOLARYNGOLOGY

## 2018-02-05 PROCEDURE — 88173 CYTOPATH EVAL FNA REPORT: CPT | Performed by: PATHOLOGY

## 2018-02-05 PROCEDURE — 10022 HB FNA W/IMAGE: CPT

## 2018-02-05 PROCEDURE — 88172 CYTP DX EVAL FNA 1ST EA SITE: CPT | Performed by: PATHOLOGY

## 2018-02-05 PROCEDURE — 76942 ECHO GUIDE FOR BIOPSY: CPT

## 2018-02-05 PROCEDURE — 88172 CYTP DX EVAL FNA 1ST EA SITE: CPT | Performed by: OTOLARYNGOLOGY

## 2018-02-05 RX ORDER — LIDOCAINE HYDROCHLORIDE 10 MG/ML
2 INJECTION, SOLUTION INFILTRATION; PERINEURAL ONCE
Status: COMPLETED | OUTPATIENT
Start: 2018-02-05 | End: 2018-02-05

## 2018-02-05 RX ADMIN — LIDOCAINE HYDROCHLORIDE 2 ML: 10 INJECTION, SOLUTION INFILTRATION; PERINEURAL at 11:53

## 2018-07-23 ENCOUNTER — TRANSCRIBE ORDERS (OUTPATIENT)
Dept: ADMINISTRATIVE | Facility: HOSPITAL | Age: 53
End: 2018-07-23

## 2018-07-23 DIAGNOSIS — E04.1 THYROID NODULE: Primary | ICD-10-CM

## 2018-08-02 ENCOUNTER — HOSPITAL ENCOUNTER (OUTPATIENT)
Dept: ULTRASOUND IMAGING | Facility: HOSPITAL | Age: 53
Discharge: HOME | End: 2018-08-02
Attending: OTOLARYNGOLOGY
Payer: COMMERCIAL

## 2018-08-02 DIAGNOSIS — E04.1 THYROID NODULE: ICD-10-CM

## 2018-08-02 PROCEDURE — 76536 US EXAM OF HEAD AND NECK: CPT

## 2018-09-10 ENCOUNTER — TRANSCRIBE ORDERS (OUTPATIENT)
Dept: ADMINISTRATIVE | Facility: HOSPITAL | Age: 53
End: 2018-09-10

## 2018-09-10 DIAGNOSIS — R51.9 FACIAL PAIN: Primary | ICD-10-CM

## 2018-10-10 ENCOUNTER — HOSPITAL ENCOUNTER (OUTPATIENT)
Dept: CT IMAGING | Facility: HOSPITAL | Age: 53
Discharge: HOME | End: 2018-10-10
Attending: OTOLARYNGOLOGY
Payer: COMMERCIAL

## 2018-10-10 DIAGNOSIS — R51.9 FACIAL PAIN: ICD-10-CM

## 2018-10-10 PROCEDURE — 70486 CT MAXILLOFACIAL W/O DYE: CPT

## 2019-11-09 ENCOUNTER — HOSPITAL ENCOUNTER (EMERGENCY)
Facility: HOSPITAL | Age: 54
Discharge: HOME | End: 2019-11-09
Attending: EMERGENCY MEDICINE | Admitting: EMERGENCY MEDICINE
Payer: COMMERCIAL

## 2019-11-09 ENCOUNTER — APPOINTMENT (EMERGENCY)
Dept: CT IMAGING | Facility: HOSPITAL | Age: 54
End: 2019-11-09
Payer: COMMERCIAL

## 2019-11-09 VITALS
HEART RATE: 68 BPM | WEIGHT: 251.1 LBS | RESPIRATION RATE: 19 BRPM | BODY MASS INDEX: 45.93 KG/M2 | DIASTOLIC BLOOD PRESSURE: 72 MMHG | OXYGEN SATURATION: 99 % | TEMPERATURE: 98.3 F | SYSTOLIC BLOOD PRESSURE: 141 MMHG

## 2019-11-09 DIAGNOSIS — R10.9 ABDOMINAL PAIN: ICD-10-CM

## 2019-11-09 DIAGNOSIS — R30.0 DYSURIA: Primary | ICD-10-CM

## 2019-11-09 LAB
ANION GAP SERPL CALCULATED.3IONS-SCNC: 9 MMOL/L (ref 4–13)
BACTERIA UR QL AUTO: ABNORMAL /HPF
BASOPHILS # BLD AUTO: 0.03 THOUSANDS/ΜL (ref 0–0.1)
BASOPHILS NFR BLD AUTO: 0 % (ref 0–1)
BILIRUB UR QL STRIP: NEGATIVE
BUN SERPL-MCNC: 19 MG/DL (ref 5–25)
CALCIUM SERPL-MCNC: 9.4 MG/DL (ref 8.3–10.1)
CHLORIDE SERPL-SCNC: 103 MMOL/L (ref 100–108)
CLARITY UR: ABNORMAL
CO2 SERPL-SCNC: 28 MMOL/L (ref 21–32)
COLOR UR: YELLOW
CREAT SERPL-MCNC: 0.85 MG/DL (ref 0.6–1.3)
EOSINOPHIL # BLD AUTO: 0.03 THOUSAND/ΜL (ref 0–0.61)
EOSINOPHIL NFR BLD AUTO: 0 % (ref 0–6)
ERYTHROCYTE [DISTWIDTH] IN BLOOD BY AUTOMATED COUNT: 13.1 % (ref 11.6–15.1)
GFR SERPL CREATININE-BSD FRML MDRD: 77 ML/MIN/1.73SQ M
GLUCOSE SERPL-MCNC: 102 MG/DL (ref 65–140)
GLUCOSE UR STRIP-MCNC: NEGATIVE MG/DL
HCT VFR BLD AUTO: 43.8 % (ref 34.8–46.1)
HGB BLD-MCNC: 13.6 G/DL (ref 11.5–15.4)
HGB UR QL STRIP.AUTO: ABNORMAL
IMM GRANULOCYTES # BLD AUTO: 0.01 THOUSAND/UL (ref 0–0.2)
IMM GRANULOCYTES NFR BLD AUTO: 0 % (ref 0–2)
KETONES UR STRIP-MCNC: NEGATIVE MG/DL
LEUKOCYTE ESTERASE UR QL STRIP: ABNORMAL
LYMPHOCYTES # BLD AUTO: 3.2 THOUSANDS/ΜL (ref 0.6–4.47)
LYMPHOCYTES NFR BLD AUTO: 49 % (ref 14–44)
MCH RBC QN AUTO: 27.3 PG (ref 26.8–34.3)
MCHC RBC AUTO-ENTMCNC: 31.1 G/DL (ref 31.4–37.4)
MCV RBC AUTO: 88 FL (ref 82–98)
MONOCYTES # BLD AUTO: 0.42 THOUSAND/ΜL (ref 0.17–1.22)
MONOCYTES NFR BLD AUTO: 6 % (ref 4–12)
MUCOUS THREADS UR QL AUTO: ABNORMAL
NEUTROPHILS # BLD AUTO: 3.01 THOUSANDS/ΜL (ref 1.85–7.62)
NEUTS SEG NFR BLD AUTO: 45 % (ref 43–75)
NITRITE UR QL STRIP: POSITIVE
NON-SQ EPI CELLS URNS QL MICRO: ABNORMAL /HPF
NRBC BLD AUTO-RTO: 0 /100 WBCS
PH UR STRIP.AUTO: 7 [PH] (ref 4.5–8)
PLATELET # BLD AUTO: 304 THOUSANDS/UL (ref 149–390)
PMV BLD AUTO: 9.8 FL (ref 8.9–12.7)
POTASSIUM SERPL-SCNC: 4.6 MMOL/L (ref 3.5–5.3)
PROT UR STRIP-MCNC: ABNORMAL MG/DL
RBC # BLD AUTO: 4.98 MILLION/UL (ref 3.81–5.12)
RBC #/AREA URNS AUTO: ABNORMAL /HPF
SODIUM SERPL-SCNC: 140 MMOL/L (ref 136–145)
SP GR UR STRIP.AUTO: 1.02 (ref 1–1.03)
UROBILINOGEN UR QL STRIP.AUTO: 0.2 E.U./DL
WBC # BLD AUTO: 6.7 THOUSAND/UL (ref 4.31–10.16)
WBC #/AREA URNS AUTO: ABNORMAL /HPF

## 2019-11-09 PROCEDURE — 80048 BASIC METABOLIC PNL TOTAL CA: CPT | Performed by: EMERGENCY MEDICINE

## 2019-11-09 PROCEDURE — 87086 URINE CULTURE/COLONY COUNT: CPT

## 2019-11-09 PROCEDURE — 99284 EMERGENCY DEPT VISIT MOD MDM: CPT

## 2019-11-09 PROCEDURE — 74177 CT ABD & PELVIS W/CONTRAST: CPT

## 2019-11-09 PROCEDURE — 81001 URINALYSIS AUTO W/SCOPE: CPT

## 2019-11-09 PROCEDURE — 85025 COMPLETE CBC W/AUTO DIFF WBC: CPT | Performed by: EMERGENCY MEDICINE

## 2019-11-09 PROCEDURE — 99284 EMERGENCY DEPT VISIT MOD MDM: CPT | Performed by: EMERGENCY MEDICINE

## 2019-11-09 PROCEDURE — 36415 COLL VENOUS BLD VENIPUNCTURE: CPT | Performed by: EMERGENCY MEDICINE

## 2019-11-09 RX ORDER — CEPHALEXIN 500 MG/1
500 CAPSULE ORAL 2 TIMES DAILY
Qty: 14 CAPSULE | Refills: 0 | Status: SHIPPED | OUTPATIENT
Start: 2019-11-09 | End: 2019-11-16

## 2019-11-09 RX ADMIN — IOHEXOL 100 ML: 350 INJECTION, SOLUTION INTRAVENOUS at 12:11

## 2019-11-09 NOTE — ED PROVIDER NOTES
History  Chief Complaint   Patient presents with    Abdominal Pain     frequent urination X 2-3 weeks , lower abd discomfort, has appointment for urology on 11/20 for same     24-year-old female presents for evaluation with chief complaint of lower abdominal burning pain, dysuria and urinary frequency  Patient reports these symptoms have been ongoing since May  Patient has an appointment with a specialist in 11 days however she reports that her symptoms have been so bothersome lately that she is unable to sleep or function  No fevers or chills  No vaginal discharge  Patient does have some minor back pain  No nausea, vomiting or diarrhea  Patient's past medical history significant for hypothyroidism, hypertension and non insulin-dependent diabetes  Patient reports she has had numerous tests including urinalysis which have not revealed any acute pathology  History provided by:  Patient   used: No    Abdominal Pain   Pain location:  LLQ  Pain quality: aching and burning    Pain radiates to:  Suprapubic region  Pain severity:  Mild  Onset quality:  Gradual  Duration:  24 weeks  Timing:  Intermittent  Progression:  Waxing and waning  Chronicity:  Chronic  Relieved by:  Nothing  Worsened by:  Nothing  Ineffective treatments:  None tried  Associated symptoms: dysuria    Associated symptoms: no chest pain, no chills, no diarrhea, no fever, no hematuria, no nausea, no shortness of breath and no vomiting    Risk factors: obesity        Prior to Admission Medications   Prescriptions Last Dose Informant Patient Reported? Taking?    Methylprednisolone (MEDROL) 4 MG TBPK   No No   Sig: Use as directed on package   gabapentin (NEURONTIN) 300 mg capsule Not Taking at Unknown time  Yes No   Sig: Take 100 mg by mouth 3 (three) times a day   levothyroxine 75 mcg tablet 11/8/2019 at Unknown time  Yes Yes   Sig: Take 75 mcg by mouth daily   losartan (COZAAR) 50 mg tablet 11/8/2019 at Unknown time  Yes Yes   Sig: Take 50 mg by mouth 2 (two) times a day 50 mg in the morning and 25 mg at night    metFORMIN (GLUCOPHAGE) 500 mg tablet 11/9/2019 at Unknown time  Yes Yes   Sig: Take 500 mg by mouth 2 (two) times a day with meals   simvastatin (ZOCOR) 20 mg tablet Not Taking at Unknown time  Yes No   Sig: Take 20 mg by mouth daily   sitaGLIPtin (JANUVIA) 100 mg tablet 11/8/2019 at Unknown time  Yes Yes   Sig: Take 100 mg by mouth daily      Facility-Administered Medications: None       Past Medical History:   Diagnosis Date    Diabetes mellitus ([facility] [address] )     Disease of thyroid gland     Hypertension        Past Surgical History:   Procedure Laterality Date    BUNIONECTOMY Left     SINUS SURGERY         History reviewed  No pertinent family history  I have reviewed and agree with the history as documented  Social History     Tobacco Use    Smoking status: Former Smoker    Smokeless tobacco: Never Used   Substance Use Topics    Alcohol use: Yes     Comment: socially    Drug use: No        Review of Systems   Constitutional: Negative for chills, diaphoresis and fever  Respiratory: Negative for shortness of breath  Cardiovascular: Negative for chest pain and palpitations  Gastrointestinal: Positive for abdominal pain  Negative for diarrhea, nausea and vomiting  Genitourinary: Positive for dysuria and frequency  Negative for hematuria  Skin: Negative for rash  All other systems reviewed and are negative  Physical Exam  Physical Exam   Constitutional: She is oriented to person, place, and time  She appears well-developed and well-nourished  She appears distressed (mild)  HENT:   Head: Normocephalic and atraumatic  Eyes: Pupils are equal, round, and reactive to light  EOM are normal    Neck: Normal range of motion  No JVD present  Cardiovascular: Normal rate, regular rhythm, normal heart sounds and intact distal pulses  Exam reveals no gallop and no friction rub     No murmur heard   Pulmonary/Chest: Effort normal and breath sounds normal  No respiratory distress  She has no wheezes  She has no rales  She exhibits no tenderness  Abdominal: Soft  Normal appearance  There is tenderness (mild) in the suprapubic area  There is no rebound and no guarding  Musculoskeletal: Normal range of motion  She exhibits no tenderness  Neurological: She is alert and oriented to person, place, and time  Skin: Skin is warm and dry  Psychiatric: She has a normal mood and affect  Her behavior is normal  Judgment and thought content normal    Nursing note and vitals reviewed  Vital Signs  ED Triage Vitals   Temperature Pulse Respirations Blood Pressure SpO2   11/09/19 1118 11/09/19 1118 11/09/19 1118 11/09/19 1118 11/09/19 1118   98 3 °F (36 8 °C) 72 18 143/73 98 %      Temp Source Heart Rate Source Patient Position - Orthostatic VS BP Location FiO2 (%)   11/09/19 1118 11/09/19 1325 -- 11/09/19 1325 --   Oral Monitor  Right arm       Pain Score       11/09/19 1118       8           Vitals:    11/09/19 1118 11/09/19 1325   BP: 143/73 141/72   Pulse: 72 68         Visual Acuity      ED Medications  Medications   iohexol (OMNIPAQUE) 350 MG/ML injection (MULTI-DOSE) 100 mL (100 mL Intravenous Given 11/9/19 1211)       Diagnostic Studies  Results Reviewed     Procedure Component Value Units Date/Time    Urine Microscopic [[id number]]  (Abnormal) Collected:  11/09/19 1129    Lab Status:  Final result Specimen:  Urine, Clean Catch Updated:  11/09/19 1241     RBC, UA 2-4 /hpf      WBC, UA 10-20 /hpf      Epithelial Cells Occasional /hpf      Bacteria, UA Moderate /hpf      MUCUS THREADS Occasional    Urine culture [[id number]] Collected:  11/09/19 1129    Lab Status:   In process Specimen:  Urine, Clean Catch Updated:  11/09/19 4258    Basic metabolic panel [[id number]] Collected:  11/09/19 1141    Lab Status:  Final result Specimen:  Blood from Arm, Right Updated:  11/09/19 1156     Sodium 140 mmol/L Potassium 4 6 mmol/L      Chloride 103 mmol/L      CO2 28 mmol/L      ANION GAP 9 mmol/L      BUN 19 mg/dL      Creatinine 0 85 mg/dL      Glucose 102 mg/dL      Calcium 9 4 mg/dL      eGFR 77 ml/min/1 73sq m     Narrative:       [place] guidelines for Chronic Kidney Disease (CKD):     Stage 1 with normal or high GFR (GFR > 90 mL/min/1 73 square meters)    Stage 2 Mild CKD (GFR = 60-89 mL/min/1 73 square meters)    Stage 3A Moderate CKD (GFR = 45-59 mL/min/1 73 square meters)    Stage 3B Moderate CKD (GFR = 30-44 mL/min/1 73 square meters)    Stage 4 Severe CKD (GFR = 15-29 mL/min/1 73 square meters)    Stage 5 End Stage CKD (GFR <15 mL/min/1 73 square meters)  Note: GFR calculation is accurate only with a steady state creatinine    CBC and differential [[id number]]  (Abnormal) Collected:  11/09/19 1141    Lab Status:  Final result Specimen:  Blood from Arm, Right Updated:  11/09/19 1147     WBC 6 70 Thousand/uL      RBC 4 98 Million/uL      Hemoglobin 13 6 g/dL      Hematocrit 43 8 %      MCV 88 fL      MCH 27 3 pg      MCHC 31 1 g/dL      RDW 13 1 %      MPV 9 8 fL      Platelets 996 Thousands/uL      nRBC 0 /100 WBCs      Neutrophils Relative 45 %      Immat GRANS % 0 %      Lymphocytes Relative 49 %      Monocytes Relative 6 %      Eosinophils Relative 0 %      Basophils Relative 0 %      Neutrophils Absolute 3 01 Thousands/µL      Immature Grans Absolute 0 01 Thousand/uL      Lymphocytes Absolute 3 20 Thousands/µL      Monocytes Absolute 0 42 Thousand/µL      Eosinophils Absolute 0 03 Thousand/µL      Basophils Absolute 0 03 Thousands/µL     ED Urine Macroscopic [[id number]]  (Abnormal) Collected:  11/09/19 1129    Lab Status:  Final result Specimen:  Urine Updated:  11/09/19 1132     Color, UA Yellow     Clarity, UA Slightly Cloudy     pH, UA 7 0     Leukocytes, UA Trace     Nitrite, UA Positive     Protein,  (2+) mg/dl      Glucose, UA Negative mg/dl      Ketones, UA Negative mg/dl      Urobilinogen, UA 0 2 E U /dl      Bilirubin, UA Negative     Blood, UA Small     Specific [place], UA 1 020    Narrative:       CLINITEK RESULT                 CT abdomen pelvis with contrast   Final Result by Karen Mishra DO (11/09 1300)   1  No acute abdominal pelvic abnormality  2   4 mm noncalcified right middle lobe pulmonary nodule  Based on current Fleischner Society 2017 Guidelines on incidental pulmonary nodule, no routine follow-up is needed if the patient is considered low risk for lung cancer  If the patient is    considered high risk for lung cancer, 12 month follow-up non-contrast chest CT is recommended           Workstation performed: [id number]                    Procedures  Procedures       ED Course                               MDM  Number of Diagnoses or Management Options  Abdominal pain: new and requires workup  Dysuria: new and requires workup  Diagnosis management comments: Background: 62 y o  female presents with urinary frequency, suprapubic and llq abdominal pain    Differential DX includes but is not limited to: interstitial cystitis, fibromyalgia, urinary tract infection, colitis, diverticulitis, mesenteric adenitis    Plan: cbc, metabolic panel, urinalysis, ct abdomen          Amount and/or Complexity of Data Reviewed  Clinical lab tests: ordered and reviewed  Tests in the radiology section of CPT®: ordered and reviewed    Risk of Complications, Morbidity, and/or Mortality  Presenting problems: high  Diagnostic procedures: high  Management options: high    Patient Progress  Patient progress: stable      Disposition  Final diagnoses:   Dysuria   Abdominal pain     Time reflects when diagnosis was documented in both MDM as applicable and the Disposition within this note     Time User Action Codes Description Comment    11/9/2019  1:09 PM Karley Bone Add [R30 0] Dysuria     11/9/2019  1:09 PM Reyes VANG Add [R10 9] Abdominal pain       ED Disposition ED Disposition Condition Date/Time Comment    Discharge Stable Sat Nov 9, 2019  1:09 PM Pooja Gama discharge to home/self care  Follow-up Information     Follow up With Specialties Details Why Contact Info    your specialist  On 11/20/2019            Discharge Medication List as of 11/9/2019  1:12 PM      START taking these medications    Details   cephalexin (KEFLEX) 500 mg capsule Take 1 capsule (500 mg total) by mouth 2 (two) times a day for 7 days, Starting Sat 11/9/2019, Until Sat 11/16/2019, Print         CONTINUE these medications which have NOT CHANGED    Details   levothyroxine 75 mcg tablet Take 75 mcg by mouth daily, Historical Med      losartan (COZAAR) 50 mg tablet Take 50 mg by mouth 2 (two) times a day 50 mg in the morning and 25 mg at night , Historical Med      metFORMIN (GLUCOPHAGE) 500 mg tablet Take 500 mg by mouth 2 (two) times a day with meals, Until Discontinued, Historical Med      sitaGLIPtin (JANUVIA) 100 mg tablet Take 100 mg by mouth daily, Historical Med      gabapentin (NEURONTIN) 300 mg capsule Take 100 mg by mouth 3 (three) times a day, Historical Med      Methylprednisolone (MEDROL) 4 MG TBPK Use as directed on package, Print      simvastatin (ZOCOR) 20 mg tablet Take 20 mg by mouth daily, Historical Med           No discharge procedures on file      ED Provider  Electronically Signed by           Laurel Romano MD  11/09/19 8983

## 2019-11-09 NOTE — DISCHARGE INSTRUCTIONS
Your CT scan showed a 4 mm noncalcified right middle lobe pulmonary nodule  Based on current Fleischner Society 2017 Guidelines on incidental pulmonary nodule, no routine follow-up is needed if the patient is considered low risk for lung cancer  If the patient you quit smoking more than 10 years ago you are low risk but if not you would be considered at increased risk for lung cancer and a 12 month follow-up non-contrast chest CT is recommended

## 2019-11-10 LAB — BACTERIA UR CULT: NORMAL

## 2022-03-04 ENCOUNTER — TELEPHONE (OUTPATIENT)
Dept: NEUROLOGY | Facility: CLINIC | Age: 57
End: 2022-03-04

## 2022-03-04 NOTE — TELEPHONE ENCOUNTER
Patient suffered 2 seizures in February and went to the ED both times  She was in feb 22-24 for the last seizure @ [facility] in [place]  Did not see a neurologist in the ED but was put on Kepra  Made patient aware it was a new patient visit since she was never seen in [address] [place] [address] even thought its  hospital follow up  Referring Dr Jaguar Castañeda   Asked Epilepsy triage questions

## 2022-06-28 ENCOUNTER — TELEPHONE (OUTPATIENT)
Dept: NEUROLOGY | Facility: CLINIC | Age: 57
End: 2022-06-28

## 2022-06-28 NOTE — TELEPHONE ENCOUNTER
LVM X2 to reschedule patient's 9/30 NP apt with Dr John Hanson as he will be unavailable this day  Letter has also been sent notifying patient  Patient removed from schedule

## 2022-09-27 ENCOUNTER — OFFICE VISIT (OUTPATIENT)
Dept: URGENT CARE | Facility: MEDICAL CENTER | Age: 57
End: 2022-09-27
Payer: COMMERCIAL

## 2022-09-27 VITALS
WEIGHT: 223.2 LBS | SYSTOLIC BLOOD PRESSURE: 155 MMHG | RESPIRATION RATE: 20 BRPM | DIASTOLIC BLOOD PRESSURE: 74 MMHG | OXYGEN SATURATION: 99 % | HEART RATE: 86 BPM | BODY MASS INDEX: 40.82 KG/M2 | TEMPERATURE: 98.2 F

## 2022-09-27 DIAGNOSIS — K08.9 TOOTH DISEASE: Primary | ICD-10-CM

## 2022-09-27 PROCEDURE — 99213 OFFICE O/P EST LOW 20 MIN: CPT | Performed by: PHYSICIAN ASSISTANT

## 2022-09-27 RX ORDER — IBUPROFEN 600 MG/1
600 TABLET ORAL EVERY 8 HOURS PRN
Qty: 30 TABLET | Refills: 0 | Status: SHIPPED | OUTPATIENT
Start: 2022-09-27

## 2022-09-27 RX ORDER — AMOXICILLIN AND CLAVULANATE POTASSIUM 500; 125 MG/1; MG/1
1 TABLET, FILM COATED ORAL EVERY 8 HOURS SCHEDULED
Qty: 21 TABLET | Refills: 0 | Status: SHIPPED | OUTPATIENT
Start: 2022-09-27 | End: 2022-10-04

## 2022-09-27 RX ORDER — LEVETIRACETAM 500 MG/1
500 TABLET ORAL 2 TIMES DAILY
COMMUNITY
Start: 2022-08-14 | End: 2023-09-01

## 2022-09-27 NOTE — PATIENT INSTRUCTIONS
Toothache   WHAT YOU NEED TO KNOW:   A toothache is pain that is caused by irritation of the nerves in the center of your tooth  The irritation may be caused by several problems, such as a cavity, an infection, a cracked tooth, or gum disease  DISCHARGE INSTRUCTIONS:   Return to the emergency department if:   You have trouble breathing or swallowing  You have swelling in your face or neck  Contact your dentist if:   You have a fever and chills  You have trouble opening or closing your mouth  You have swelling around your tooth  You have questions or concerns about your condition or care  Medicines: You may  need any of the following:  NSAIDs , such as ibuprofen, help decrease swelling, pain, and fever  This medicine is available with or without a doctor's order  NSAIDs can cause stomach bleeding or kidney problems in certain people  If you take blood thinner medicine, always ask if NSAIDs are safe for you  Always read the medicine label and follow directions  Do not give these medicines to children under 10months of age without direction from your child's healthcare provider  Acetaminophen  decreases pain and fever  It is available without a doctor's order  Ask how much to take and how often to take it  Follow directions  Acetaminophen can cause liver damage if not taken correctly  Prescription pain medicine  may be given  Ask your healthcare provider how to take this medicine safely  Some prescription pain medicines contain acetaminophen  Do not take other medicines that contain acetaminophen without talking to your healthcare provider  Too much acetaminophen may cause liver damage  Prescription pain medicine may cause constipation  Ask your healthcare provider how to prevent or treat constipation  Antibiotics  help treat or prevent a bacterial infection  Take your medicine as directed    Contact your healthcare provider if you think your medicine is not helping or if you have side effects  Tell him of her if you are allergic to any medicine  Keep a list of the medicines, vitamins, and herbs you take  Include the amounts, and when and why you take them  Bring the list or the pill bottles to follow-up visits  Carry your medicine list with you in case of an emergency  Self-care:   Rinse your mouth with warm salt water  4 times a day or as directed  Eat soft foods  to help relieve pain caused by chewing  Apply ice on your jaw or cheek  for 15 to 20 minutes every hour or as directed  Use an ice pack, or put crushed ice in a plastic bag  Cover it with a towel before you apply it  Ice helps prevent tissue damage and decreases swelling and pain  Help prevent a toothache:   Brush your teeth at least 2 times a day  Use dental floss to clean between your teeth at least 1 time a day  See your dentist regularly every 6 months for dental cleanings and oral exams  Follow up with your dentist as directed: You may be referred to a dental surgeon  Write down your questions so you remember to ask them during your visits  © Copyright [employer] 2022 Information is for End User's use only and may not be sold, redistributed or otherwise used for commercial purposes  All illustrations and images included in CareNotes® are the copyrighted property of [employer]  or [facility] Sylvia Crawley   The above information is an  only  It is not intended as medical advice for individual conditions or treatments  Talk to your doctor, nurse or pharmacist before following any medical regimen to see if it is safe and effective for you

## 2022-09-27 NOTE — PROGRESS NOTES
[facility]        NAME: Griselda Lema is a 59 y o  female  : 1965    MRN: [id number]  DATE: 2022  TIME: 3:49 PM    Assessment and Plan   Tooth disease [K08 9]  1  Tooth disease  amoxicillin-clavulanate (Augmentin) 500-125 mg per tablet    ibuprofen (MOTRIN) 600 mg tablet         Patient Instructions       Follow up with PCP in 3-5 days  Proceed to  ER if symptoms worsen  Chief Complaint     Chief Complaint   Patient presents with    Dental Pain     Left side tooth pain, tooth pulled on   Pt reports yellow discharge from extraction site  History of Present Illness       47-year-old female is complaining today of left lower molar pain  Patient had work done in which a tooth was pulled on  by her dentist at [facility]  She reports swelling inflammation a pain on a scale is 6 intermittently, alleviated with over-the-counter NSAIDs  She is also doing warm water gargles with salt to help fight off any possible infections  Symptoms associated with the headache and left lower jaw discomfort  She denies nausea vomiting fever chills weakness, dizziness lightheadedness  Dental Pain   Pertinent negatives include no fever  Review of Systems   Review of Systems   Constitutional: Negative for activity change, appetite change, chills and fever  HENT: Negative for congestion and sinus pain  Eyes: Negative for photophobia and visual disturbance  Respiratory: Negative for cough, chest tightness, shortness of breath and wheezing  Cardiovascular: Negative for chest pain and palpitations  Gastrointestinal: Negative for abdominal pain, diarrhea, nausea and vomiting  Musculoskeletal: Negative for arthralgias  Skin: Negative for color change and rash  Neurological: Negative for numbness  Psychiatric/Behavioral: Negative for agitation           Current Medications       Current Outpatient Medications:     amoxicillin-clavulanate (Augmentin) 500-125 mg per tablet, Take 1 tablet by mouth every 8 (eight) hours for 7 days, Disp: 21 tablet, Rfl: 0    gabapentin (NEURONTIN) 300 mg capsule, Take 100 mg by mouth 3 (three) times a day, Disp: , Rfl:     ibuprofen (MOTRIN) 600 mg tablet, Take 1 tablet (600 mg total) by mouth every 8 (eight) hours as needed for mild pain, Disp: 30 tablet, Rfl: 0    levETIRAcetam (KEPPRA) 500 mg tablet, Take 500 mg by mouth 2 (two) times a day, Disp: , Rfl:     levothyroxine 75 mcg tablet, Take 75 mcg by mouth daily, Disp: , Rfl:     losartan (COZAAR) 50 mg tablet, Take 50 mg by mouth 2 (two) times a day 50 mg in the morning and 25 mg at night , Disp: , Rfl:     metFORMIN (GLUCOPHAGE) 500 mg tablet, Take 500 mg by mouth 2 (two) times a day with meals, Disp: , Rfl:     Methylprednisolone (MEDROL) 4 MG TBPK, Use as directed on package, Disp: 21 tablet, Rfl: 0    simvastatin (ZOCOR) 20 mg tablet, Take 20 mg by mouth daily, Disp: , Rfl:     sitaGLIPtin (JANUVIA) 100 mg tablet, Take 100 mg by mouth daily, Disp: , Rfl:     Current Allergies     Allergies as of 09/27/2022 - Reviewed 09/27/2022   Allergen Reaction Noted    Sulfa antibiotics Swelling 06/01/2017            The following portions of the patient's history were reviewed and updated as appropriate: allergies, current medications, past family history, past medical history, past social history, past surgical history and problem list      Past Medical History:   Diagnosis Date    Diabetes mellitus ([address] )     Disease of thyroid gland     Hypertension        Past Surgical History:   Procedure Laterality Date    BUNIONECTOMY Left     SINUS SURGERY         History reviewed  No pertinent family history  Medications have been verified  Objective   /74   Pulse 86   Temp 98 2 °F (36 8 °C)   Resp 20   Wt 101 kg (223 lb 3 2 oz)   SpO2 99%   BMI 40 82 kg/m²        Physical Exam     Physical Exam  Vitals and nursing note reviewed     Constitutional:       General: She is not in acute distress  Appearance: Normal appearance  She is not ill-appearing  HENT:      Head: Normocephalic and atraumatic  Right Ear: Tympanic membrane, ear canal and external ear normal       Left Ear: Tympanic membrane, ear canal and external ear normal       Nose: Nose normal       Mouth/Throat:      Mouth: Mucous membranes are moist       Comments: Left lower molar appears to be removed however there appears to be in proper healing at this time  No purulent discharge was seen however there is mild swelling and inflammation  Eyes:      Extraocular Movements: Extraocular movements intact  Conjunctiva/sclera: Conjunctivae normal       Pupils: Pupils are equal, round, and reactive to light  Cardiovascular:      Rate and Rhythm: Normal rate and regular rhythm  Pulses: Normal pulses  Heart sounds: Normal heart sounds  Pulmonary:      Effort: Pulmonary effort is normal  No respiratory distress  Breath sounds: Normal breath sounds  Musculoskeletal:         General: Normal range of motion  Cervical back: Normal range of motion and neck supple  No rigidity or tenderness  Lymphadenopathy:      Cervical: No cervical adenopathy  Skin:     General: Skin is warm and dry  Capillary Refill: Capillary refill takes less than 2 seconds  Neurological:      General: No focal deficit present  Mental Status: She is alert and oriented to person, place, and time  Psychiatric:         Mood and Affect: Mood normal          Behavior: Behavior normal          Thought Content:  Thought content normal          Judgment: Judgment normal